# Patient Record
Sex: FEMALE | Race: WHITE | NOT HISPANIC OR LATINO
[De-identification: names, ages, dates, MRNs, and addresses within clinical notes are randomized per-mention and may not be internally consistent; named-entity substitution may affect disease eponyms.]

---

## 2020-03-20 PROBLEM — Z00.00 ENCOUNTER FOR PREVENTIVE HEALTH EXAMINATION: Status: ACTIVE | Noted: 2020-03-20

## 2020-05-04 ENCOUNTER — APPOINTMENT (OUTPATIENT)
Dept: SURGERY | Facility: CLINIC | Age: 50
End: 2020-05-04

## 2020-05-28 ENCOUNTER — APPOINTMENT (OUTPATIENT)
Dept: SURGERY | Facility: CLINIC | Age: 50
End: 2020-05-28

## 2020-08-14 ENCOUNTER — RECORD ABSTRACTING (OUTPATIENT)
Age: 50
End: 2020-08-14

## 2020-08-14 DIAGNOSIS — Z78.9 OTHER SPECIFIED HEALTH STATUS: ICD-10-CM

## 2020-08-14 DIAGNOSIS — Z82.49 FAMILY HISTORY OF ISCHEMIC HEART DISEASE AND OTHER DISEASES OF THE CIRCULATORY SYSTEM: ICD-10-CM

## 2020-08-14 DIAGNOSIS — Z80.0 FAMILY HISTORY OF MALIGNANT NEOPLASM OF DIGESTIVE ORGANS: ICD-10-CM

## 2020-08-14 RX ORDER — GLUC/MSM/COLGN2/HYAL/ANTIARTH3 375-375-20
TABLET ORAL
Refills: 0 | Status: ACTIVE | COMMUNITY

## 2020-09-29 ENCOUNTER — APPOINTMENT (OUTPATIENT)
Dept: SURGERY | Facility: CLINIC | Age: 50
End: 2020-09-29

## 2020-10-29 ENCOUNTER — APPOINTMENT (OUTPATIENT)
Dept: SURGERY | Facility: CLINIC | Age: 50
End: 2020-10-29
Payer: COMMERCIAL

## 2020-10-29 VITALS
HEART RATE: 89 BPM | TEMPERATURE: 97.8 F | HEIGHT: 66 IN | WEIGHT: 180 LBS | BODY MASS INDEX: 28.93 KG/M2 | OXYGEN SATURATION: 99 % | SYSTOLIC BLOOD PRESSURE: 132 MMHG | DIASTOLIC BLOOD PRESSURE: 81 MMHG

## 2020-10-29 DIAGNOSIS — Z78.9 OTHER SPECIFIED HEALTH STATUS: ICD-10-CM

## 2020-10-29 DIAGNOSIS — Z63.5 DISRUPTION OF FAMILY BY SEPARATION AND DIVORCE: ICD-10-CM

## 2020-10-29 PROCEDURE — 99203 OFFICE O/P NEW LOW 30 MIN: CPT

## 2020-10-29 PROCEDURE — 99072 ADDL SUPL MATRL&STAF TM PHE: CPT

## 2020-10-29 SDOH — SOCIAL STABILITY - SOCIAL INSECURITY: DISRUPTION OF FAMILY BY SEPARATION AND DIVORCE: Z63.5

## 2020-10-29 NOTE — PHYSICAL EXAM
[Alert] : alert [Oriented to Person] : oriented to person [Oriented to Place] : oriented to place [Oriented to Time] : oriented to time [Calm] : calm [de-identified] : She  is alert, well-groomed, and cheerful.\par   [de-identified] : anicteric.  Nasal mucosa pink, septum midline. Oral mucosa pink.  Tongue midline, Pharynx without exudates.\par   [de-identified] : Neck supple. Trachea midline. Thyroid isthmus barely palpable, lobes not felt.\par   [de-identified] : No chest deformity. Breast are symmetric, Normal contours. No nodules, masses, tenderness, or axillary or supraclavicular  adenopathy. No nipple discharge. no skin retraction \par

## 2020-10-29 NOTE — PLAN
[FreeTextEntry1] : Ms. YOUNGBLOOD  is presenting  today for an evaluation .  she is doing well and offers no complaints.  Results of  her recent  imaging and physical examination findings were discussed in details.   She  was advised to have           Left     breast Mammogram  to confirm position of the clip and return after the tests to schedule excision.     Patient's questions and concerns addressed to patient's satisfaction.\par \par

## 2020-10-29 NOTE — HISTORY OF PRESENT ILLNESS
[de-identified] : Patient is a 50 year  year-old female  who was referred by Dr. Martinez at [facility]  with the chief complaint of having a Left   breast  intraductal papilloma . She  denies any trauma and She has no nipple discharge. She  denies any fever, night sweats or loss of appetite.  She reports family history of breast cancer: Sister age 40. Aunt age 41. She states that she  is BRCA negative.     Menarche at age 14 -2 para-2 and her last menstrual period was in September. Patient is on no hormonal replacement therapy.    Patient  had a BL mammogram on 2020 that was deemed a BIRADS 0.  She had a call back left breast mammo on 2020 that was deemed BIRADS 2.   She had a BL   sonogram of the breast on 2020 it was deemed a BIRADS 4. Ultrasound-guided core biopsy left breast x2 recommended. A sono-guided biopsy 2 sites  was done on 03/10/2020 which the pathology stated Left breast 4 o'clock 1 cm from nipple shows intraductal papilloma. Left breast 2 o'clock 6 cm from nipple shows benign fibroadenoma. Pathology results match the prebiopsy imaging appearance.  she reports breast lift in 2020.  She states that the plan was to remove the papilloma at the time of the breast lift. it was not removed. she is not sure if the clip was removed.

## 2020-10-29 NOTE — DATA REVIEWED
[FreeTextEntry1] : 	\par Exam requested by:\par PERRI VALENTIN MD\par [address] [facility] [address]\par [facility] [address]\par SITE PERFORMED: [place]\par SITE PHONE: [phone]\par Patient: JEANNE YOUNGBLOOD\par YOB: 1970\par Phone: [phone]\par MRN: [id number] Acc: [id number]\par Date of Exam: 02-\par  \par EXAM: ULTRASOUND BREAST BILATERAL COMPLETE\par \par HISTORY: The patient is 49 years old and is seen for supplemental screening evaluation of dense breasts. Patient reports: BRCA negative. Benign left breast procedure, years ago. Family history of breast cancer: Sister age 40. Aunt age 41.\par \par TECHNIQUE: A bilateral breast ultrasound was performed with complete evaluation of the four quadrants, retroareolar regions, and axillae.\par \par COMPARISON: 2017 breast MRI report. 2/17/2020 2/2/2020 mammography. 2017 breast ultrasound. 2015 breast ultrasound.\par \par FINDINGS:\par \par \par Right 12 o'clock 5 cm from nipple mass, 1 cm, no suspicious change. Right 12 o'clock 6 cm from nipple mass, 0.9 cm, no suspicious change. Right 11-12 o'clock 5 cm from nipple septated cyst, 1 cm. Right 11-12 o'clock 4 to 5 cm from nipple cyst, 1.9 cm. Right 11-12 o'clock 4 cm from nipple multiple subcentimeter cysts. Right 3 o'clock 4 cm from nipple, subcentimeter cyst. Right 3-4 o'clock 7 cm from nipple septated complicated cyst cluster versus mass, 1.3 cm, not previously seen, probably benign. Right 3-4 o'clock 2 cm from nipple septated complicated cyst, 0.8 cm, no suspicious change. Right 2-3 o'clock retroareolar few subcentimeter cysts. Right 5 o'clock 2 cm from nipple subcentimeter cyst. Right retroareolar 6-7 o'clock 2 subcentimeter cysts. Right 10 o'clock 5 cm from nipple cyst, 1.1 cm. Right 10 o'clock 5 cm from nipple multiple subcentimeter cysts. Benign right axilla.\par \par Left 11-12 o'clock 5 6 cm from nipple septated cyst, 3.3 cm, no suspicious change. Left 11-12 o'clock 5 cm from nipple complicated cyst, 1.1 cm, no suspicious change. Left 1 o'clock 5 cm from nipple septated cyst cluster, 1.1 cm, no suspicious change. Left 1 o'clock 6 cm from nipple mass versus complicated cyst, 0.5 cm, not previously seen, probably benign. Left 2 o'clock 6 cm from nipple lobulated mass, partially oblique orientation, 1.7 x 0.9 x 1.4 cm, not previously seen, indeterminate. Left 2-3 o'clock 7 cm from nipple cyst, 1.3 cm. Left 3 o'clock retroareolar few subcentimeter cysts. Left 4 o'clock 1 cm from nipple mass, 1 cm, lobulated, appears to represent left 3 o'clock retroareolar mass on prior ultrasound, increased in size, indeterminate. Left 10 o'clock retroareolar cyst, 1 cm. Benign left axilla.\par \par IMPRESSION:  \par \par 1.  Indeterminate ultrasound lesions at left breast 2 o'clock 6 cm from nipple and left breast 4 o'clock 1 cm from nipple. Ultrasound-guided core biopsy left breast x2 recommended.\par \par 2.  Probably benign ultrasound findings at right breast 3-4 o'clock 7 cm from nipple and left breast 1 o'clock 6 cm from nipple. Follow-up targeted bilateral breast ultrasound in 6 months also recommended.\par \par 3.  Follow-up screening breast MRI should be considered given significant family history. Please note that MRI is not a substitute for the additional imaging recommended above in this case.\par \par FOLLOW-UP: Ultrasound guided biopsy.\par \par \par ASSESSMENT: BI-RADS Category 4:  Suspicious.\par \par This examination should not preclude the clinical evaluation of a suspicious palpable abnormality. \par \par Thank you for the opportunity to participate in the care of this patient.  \par  \par Jorge Luis Elmore MD  - Electronically Signed: 02- 4:08 PM \par Physician to Physician Direct Line is: [phone]

## 2020-11-12 ENCOUNTER — APPOINTMENT (OUTPATIENT)
Dept: SURGERY | Facility: CLINIC | Age: 50
End: 2020-11-12
Payer: COMMERCIAL

## 2020-11-12 VITALS
HEIGHT: 66 IN | SYSTOLIC BLOOD PRESSURE: 149 MMHG | DIASTOLIC BLOOD PRESSURE: 89 MMHG | HEART RATE: 72 BPM | BODY MASS INDEX: 28.93 KG/M2 | TEMPERATURE: 98.2 F | WEIGHT: 180 LBS

## 2020-11-12 PROCEDURE — 99213 OFFICE O/P EST LOW 20 MIN: CPT

## 2020-11-12 PROCEDURE — 99072 ADDL SUPL MATRL&STAF TM PHE: CPT

## 2020-11-12 NOTE — PHYSICAL EXAM
[Alert] : alert [Oriented to Person] : oriented to person [Oriented to Place] : oriented to place [Oriented to Time] : oriented to time [Calm] : calm [de-identified] : She  is alert, well-groomed, and cheerful.\par   [de-identified] : anicteric.  Nasal mucosa pink, septum midline. Oral mucosa pink.  Tongue midline, Pharynx without exudates.\par   [de-identified] : Neck supple. Trachea midline. Thyroid isthmus barely palpable, lobes not felt.\par   [de-identified] : No chest deformity. Breast are symmetric, Normal contours. No nodules, masses, tenderness, or axillary or supraclavicular  adenopathy. No nipple discharge. no skin retraction \par

## 2020-11-12 NOTE — HISTORY OF PRESENT ILLNESS
[de-identified] : Patient is a 50 year year-old female who was referred by Dr. Martinez at [facility] with the chief complaint of having a Left breast intraductal papilloma. Ms. YOUNGBLOOD  is s/p  unilateral left diagnostic mammogram  on 11/03/2020 to confirm presence of the  biopsy clips because she had cosmetic breast surgery. results show biopsy marker   in the upper outer left breast as well as in the mid outer anterior left breast. patient is here to discuss her surgical options. \par \par Note**\par Left breast 4 o'clock 1 cm from nipple shows intraductal papilloma.\par  A rectangle shaped biopsy clip ([employer], [employer]) was deployed at the biopsy site. A sonographically visualized residual lesion was present post core biopsy.

## 2020-11-12 NOTE — DATA REVIEWED
[FreeTextEntry1] : 	\par Exam requested by:\par JUSTIN WEBSTER MD\par [address] [facility]\par [facility] [address]\par SITE PERFORMED: [place]\par SITE PHONE: [phone]\par Patient: JEANNE YOUNGBLOOD\par YOB: 1970\par Phone: [phone]\par MRN: [id number] Acc: [id number]\par Date of Exam: 11-\par  \par Addendum 1 - 11-\par  \par ADDENDUM TO REPORT: Digital unilateral left diagnostic mammogram with CAD and Tomosynthesis November 3, 2020\par \par BREAST COMPOSITION: The breasts are heterogeneously dense, which may obscure small masses.\par \par Please note that after further clinical information was supplied, the patient has not yet undergone an excisional biopsy. The patient has undergone a breast lift. A biopsy marker is noted in the upper outer left breast as well as in the mid outer anterior left breast.\par \par FOLLOW-UP: Additional imaging.\par Follow-up left breast ultrasound recommended\par ASSESSMENT: BI-RADS Category 0:  Incomplete. Need additional imaging evaluation.\par \par Thank you for the opportunity to participate in the care of this patient.  \par  \par Michael Gil MD  - Electronically Signed: 11- 10:36 AM \par Physician to Physician Direct Line is: [phone]\par Original Report\par EXAM: DIGITAL UNILATERAL LEFT DIAGNOSTIC MAMMOGRAM WITH CAD AND TOMOSYNTHESIS\par \par HISTORY: The patient is 50 years old and is seen for evaluation post excisional biopsy for intraductal papilloma hours sister and aunt \par \par CLINICAL BREAST EXAMINATION: The patient reports that her last clinical breast exam was within the past year. \par \par TECHNIQUE: Full-field digital mammography of the left breast was obtained. Additional imaging is composed of spot compression view left breast in cc projection Low-dose full-field digital breast tomosynthesis examination was performed with 3D acquisitions and C-View synthesized 2D reconstructed images. Computer-aided detection (CAD) was utilized. \par \par COMPARISON: The present examination has been compared to prior breast imaging studies dating back to November 12, 2015 \par \par FINDINGS:\par BREAST COMPOSITION: The breasts are heterogeneously dense, which may obscure small masses.\par \par Patient is status post excisional biopsy upper outer left breast with some associated distortion. Biopsy marker is also seen anterior left breast 3 4:00. There are some scattered punctate benign-appearing calcifications left breast. 8 mm nodule rounded nodule density central slightly lateral left breast on CC view. Follow-up breast ultrasound recommended.\par \par IMPRESSION: Status post excisional biopsy upper outer left breast as well as biopsy 3 -4:00 left breast. 8mm rounded nodule central slightly lateral left breast\par \par FOLLOW-UP: Additional imaging.\par Follow-up left breast ultrasound recommended  \par \par ASSESSMENT: BI-RADS Category 0:  Incomplete. Need additional imaging evaluation. \par \par This examination should not preclude the clinical evaluation of a suspicious palpable abnormality. \par \par As per the FDA requirements, a layman's letter has been generated and sent to your patient stating the results and recommendations of this breast imaging study. We have entered your patient into our reminder system and will notify them when they are due for their next breast imaging exam. \par \par Thank you for the opportunity to participate in the care of this patient.  \par  \par Michael Gil MD  - Electronically Signed: 11- 3:03 PM \par Physician to Physician Direct Line is: [phone]\par \par \par Left breast 4 o'clock 1 cm from nipple shows intraductal papilloma.\par  A rectangle shaped biopsy clip ([employer], [facility]) was deployed at the biopsy site. A sonographically visualized residual lesion was present post core biopsy. \par

## 2020-11-12 NOTE — PLAN
[FreeTextEntry1] : Ms. YOUNGBLOOD  was told significance of findings, options, risks and benefits were explained.  Informed consent for excision left breast mass with spot localization and potential risks, benefits and alternatives (surgical options were discussed including non-surgical options or the option of no surgery) to the planned surgery were discussed in depth.  All surgical options were discussed including non-surgical treatments.  She wishes to proceed with surgery.  We will plan for surgery on at the next available date, pending any required insurance pre-certification or pre-approval. She agrees to obtain any necessary pre-operative evaluations and testing prior to surgery.\par Patient advised to seek immediate medical attention with any acute change in symptoms or with the development of any new or worsening symptoms.  Patient's questions and concerns addressed to patient's satisfaction, and patient verbalized an understanding of the information discussed.\par \par

## 2020-12-06 ENCOUNTER — APPOINTMENT (OUTPATIENT)
Dept: DISASTER EMERGENCY | Facility: CLINIC | Age: 50
End: 2020-12-06

## 2020-12-11 ENCOUNTER — OUTPATIENT (OUTPATIENT)
Dept: OUTPATIENT SERVICES | Facility: HOSPITAL | Age: 50
LOS: 1 days | End: 2020-12-11
Payer: COMMERCIAL

## 2020-12-11 ENCOUNTER — OUTPATIENT (OUTPATIENT)
Dept: OUTPATIENT SERVICES | Facility: HOSPITAL | Age: 50
LOS: 1 days | End: 2020-12-11

## 2020-12-11 VITALS
HEART RATE: 75 BPM | OXYGEN SATURATION: 100 % | TEMPERATURE: 98 F | SYSTOLIC BLOOD PRESSURE: 123 MMHG | DIASTOLIC BLOOD PRESSURE: 85 MMHG | WEIGHT: 195.11 LBS | HEIGHT: 66 IN | RESPIRATION RATE: 18 BRPM

## 2020-12-11 DIAGNOSIS — Z01.818 ENCOUNTER FOR OTHER PREPROCEDURAL EXAMINATION: ICD-10-CM

## 2020-12-11 DIAGNOSIS — N63.32 UNSPECIFIED LUMP IN AXILLARY TAIL OF THE LEFT BREAST: ICD-10-CM

## 2020-12-11 DIAGNOSIS — N63.20 UNSPECIFIED LUMP IN THE LEFT BREAST, UNSPECIFIED QUADRANT: ICD-10-CM

## 2020-12-11 DIAGNOSIS — Z98.890 OTHER SPECIFIED POSTPROCEDURAL STATES: Chronic | ICD-10-CM

## 2020-12-11 PROCEDURE — G0463: CPT

## 2020-12-11 NOTE — H&P PST ADULT - NSICDXPASTSURGICALHX_GEN_ALL_CORE_FT
PAST SURGICAL HISTORY:  History of abdominoplasty     History of breast surgery Bilateral breast lift

## 2020-12-11 NOTE — H&P PST ADULT - NSICDXPROBLEM_GEN_ALL_CORE_FT
PROBLEM DIAGNOSES  Problem: Unspecified lump in axillary tail of the left breast  Assessment and Plan: Patient is scheduled for excision of left breast mass with spot localization on 12/30/2020. Preoperative instructions given to patient with understanding verbalized

## 2020-12-11 NOTE — H&P PST ADULT - NEGATIVE HEMATOLOGY SYMPTOMS
Patient informed of need for supplies, and house supervisor getting supplies.       Dinah Quevedo RN  09/05/20 6676 no gum bleeding/no nose bleeding/no skin lumps

## 2020-12-11 NOTE — H&P PST ADULT - NSANTHOSAYNRD_GEN_A_CORE
No. YOHANNES screening performed.  STOP BANG Legend: 0-2 = LOW Risk; 3-4 = INTERMEDIATE Risk; 5-8 = HIGH Risk

## 2020-12-11 NOTE — H&P PST ADULT - HISTORY OF PRESENT ILLNESS
50year old female with pmhx of anemia presents with c/o abnormal mammogram revealing benign left breast papilloma confirmed by biopsy. Patient is here today for presurgical testing for scheduled excision of left breast mass with spot localization on 12/30/2020 50year old obese female with pmhx of anemia presents with c/o abnormal mammogram revealing benign left breast papilloma confirmed by biopsy. Patient is here today for presurgical testing for scheduled excision of left breast mass with spot localization on 12/30/2020

## 2020-12-15 PROBLEM — D64.9 ANEMIA, UNSPECIFIED: Chronic | Status: ACTIVE | Noted: 2020-12-11

## 2020-12-15 PROBLEM — E66.9 OBESITY, UNSPECIFIED: Chronic | Status: ACTIVE | Noted: 2020-12-11

## 2020-12-27 ENCOUNTER — APPOINTMENT (OUTPATIENT)
Dept: DISASTER EMERGENCY | Facility: CLINIC | Age: 50
End: 2020-12-27

## 2020-12-27 DIAGNOSIS — Z01.818 ENCOUNTER FOR OTHER PREPROCEDURAL EXAMINATION: ICD-10-CM

## 2020-12-28 LAB — SARS-COV-2 N GENE NPH QL NAA+PROBE: NOT DETECTED

## 2020-12-29 ENCOUNTER — TRANSCRIPTION ENCOUNTER (OUTPATIENT)
Age: 50
End: 2020-12-29

## 2020-12-30 ENCOUNTER — APPOINTMENT (OUTPATIENT)
Dept: SURGERY | Facility: HOSPITAL | Age: 50
End: 2020-12-30
Payer: COMMERCIAL

## 2020-12-30 ENCOUNTER — OUTPATIENT (OUTPATIENT)
Dept: OUTPATIENT SERVICES | Facility: HOSPITAL | Age: 50
LOS: 1 days | End: 2020-12-30
Payer: COMMERCIAL

## 2020-12-30 ENCOUNTER — RESULT REVIEW (OUTPATIENT)
Age: 50
End: 2020-12-30

## 2020-12-30 VITALS
RESPIRATION RATE: 16 BRPM | SYSTOLIC BLOOD PRESSURE: 131 MMHG | OXYGEN SATURATION: 99 % | HEART RATE: 89 BPM | TEMPERATURE: 98 F | DIASTOLIC BLOOD PRESSURE: 73 MMHG

## 2020-12-30 VITALS
WEIGHT: 195.11 LBS | HEIGHT: 66 IN | HEART RATE: 80 BPM | SYSTOLIC BLOOD PRESSURE: 152 MMHG | DIASTOLIC BLOOD PRESSURE: 86 MMHG | OXYGEN SATURATION: 99 % | TEMPERATURE: 98 F | RESPIRATION RATE: 18 BRPM

## 2020-12-30 DIAGNOSIS — N63.32 UNSPECIFIED LUMP IN AXILLARY TAIL OF THE LEFT BREAST: ICD-10-CM

## 2020-12-30 DIAGNOSIS — Z98.890 OTHER SPECIFIED POSTPROCEDURAL STATES: Chronic | ICD-10-CM

## 2020-12-30 LAB — HCG UR QL: NEGATIVE — SIGNIFICANT CHANGE UP

## 2020-12-30 PROCEDURE — 19125 EXCISION BREAST LESION: CPT | Mod: LT

## 2020-12-30 PROCEDURE — 19281 PERQ DEVICE BREAST 1ST IMAG: CPT | Mod: LT

## 2020-12-30 PROCEDURE — 88307 TISSUE EXAM BY PATHOLOGIST: CPT

## 2020-12-30 PROCEDURE — 19281 PERQ DEVICE BREAST 1ST IMAG: CPT

## 2020-12-30 PROCEDURE — 76098 X-RAY EXAM SURGICAL SPECIMEN: CPT

## 2020-12-30 PROCEDURE — 76098 X-RAY EXAM SURGICAL SPECIMEN: CPT | Mod: 26

## 2020-12-30 PROCEDURE — 88307 TISSUE EXAM BY PATHOLOGIST: CPT | Mod: 26

## 2020-12-30 PROCEDURE — 81025 URINE PREGNANCY TEST: CPT

## 2020-12-30 RX ORDER — FERROUS SULFATE 325(65) MG
1 TABLET ORAL
Qty: 0 | Refills: 0 | DISCHARGE

## 2020-12-30 RX ORDER — HYDROMORPHONE HYDROCHLORIDE 2 MG/ML
1 INJECTION INTRAMUSCULAR; INTRAVENOUS; SUBCUTANEOUS
Refills: 0 | Status: DISCONTINUED | OUTPATIENT
Start: 2020-12-30 | End: 2020-12-30

## 2020-12-30 RX ORDER — GABAPENTIN 400 MG/1
1 CAPSULE ORAL
Qty: 6 | Refills: 0
Start: 2020-12-30 | End: 2021-01-01

## 2020-12-30 RX ORDER — SODIUM CHLORIDE 9 MG/ML
3 INJECTION INTRAMUSCULAR; INTRAVENOUS; SUBCUTANEOUS EVERY 8 HOURS
Refills: 0 | Status: DISCONTINUED | OUTPATIENT
Start: 2020-12-30 | End: 2020-12-30

## 2020-12-30 RX ORDER — ASCORBIC ACID 60 MG
1 TABLET,CHEWABLE ORAL
Qty: 0 | Refills: 0 | DISCHARGE

## 2020-12-30 RX ORDER — ONDANSETRON 8 MG/1
4 TABLET, FILM COATED ORAL ONCE
Refills: 0 | Status: DISCONTINUED | OUTPATIENT
Start: 2020-12-30 | End: 2020-12-30

## 2020-12-30 RX ORDER — HYDROMORPHONE HYDROCHLORIDE 2 MG/ML
0.5 INJECTION INTRAMUSCULAR; INTRAVENOUS; SUBCUTANEOUS
Refills: 0 | Status: DISCONTINUED | OUTPATIENT
Start: 2020-12-30 | End: 2020-12-30

## 2020-12-30 RX ORDER — SODIUM CHLORIDE 9 MG/ML
1000 INJECTION, SOLUTION INTRAVENOUS
Refills: 0 | Status: DISCONTINUED | OUTPATIENT
Start: 2020-12-30 | End: 2020-12-30

## 2020-12-30 NOTE — BRIEF OPERATIVE NOTE - NSICDXBRIEFPROCEDURE_GEN_ALL_CORE_FT
PROCEDURES:  Excision of left breast mass with needle localization 30-Dec-2020 13:23:44  Susan Montgomery

## 2021-01-05 LAB — SURGICAL PATHOLOGY STUDY: SIGNIFICANT CHANGE UP

## 2021-02-04 PROBLEM — N63.20 BREAST MASS, LEFT: Status: ACTIVE | Noted: 2020-10-27

## 2021-02-04 NOTE — DATA REVIEWED
[FreeTextEntry1] : JEANNE YOUNGBLOOD                          2\par \par \par \par Surgical Final Report\par \par \par \par \par Final Diagnosis\par \par Mass, left breast; excision with needle guided localization:\par - Intraductal papilloma with usual ductal epithelial\par hyperplasia.\par - Foci of atypical lobular hyperplasia with pagetoid spread into\par the ducts.\par - Tissue changes consistent with previous surgical intervention.\par - Fibrocystic change harboring microcalcifications and including\par usual, micropapillary and papillary ductal epithelial\par hyperplasia, apocrine metaplasia, sclerosing and blunt duct\par adenosis, nodular adenosis, stromal\par fibrosis, fibroadenomatoid stromal change and cysts.\par \par Verified by: Brianna Salas MD\par (Electronic Signature)\par Reported on: 01/05/21 13:44 EST, [facility],\par [address], [place] [address]\par Phone: [phone]   Fax: [phone]\par _________________________________________________________________\par \par Clinical History\par Left breast lump\par \par Specimen(s) Submitted\par 1     Left breast mass\par

## 2021-02-08 ENCOUNTER — APPOINTMENT (OUTPATIENT)
Dept: SURGERY | Facility: CLINIC | Age: 51
End: 2021-02-08

## 2021-02-08 DIAGNOSIS — N63.20 UNSPECIFIED LUMP IN THE LEFT BREAST, UNSPECIFIED QUADRANT: ICD-10-CM

## 2022-05-24 ENCOUNTER — APPOINTMENT (OUTPATIENT)
Dept: INTERNAL MEDICINE | Facility: CLINIC | Age: 52
End: 2022-05-24

## 2022-12-28 NOTE — HISTORY OF PRESENT ILLNESS
Vital signs reviewed.   CONSTITUTIONAL: Well-appearing; well-nourished; in no apparent distress. Non-toxic appearing.   HEAD: Normocephalic, atraumatic.  EYES: PERRL, EOM intact, conjunctiva and sclera WNL.  CARD: Normal S1, S2; no murmurs, rubs, or gallops noted.  RESP: Normal chest excursion with respiration; breath sounds clear and equal bilaterally; no wheezes, rhonchi, or rales.  ABD/GI: soft, non-distended; non-tender; no palpable organomegaly, no pulsatile mass.  EXT/MS: moves all extremities; distal pulses are normal, no pedal edema.  SKIN: Normal for age and race; warm; dry; good turgor; no apparent lesions or exudate noted.  NEURO: Awake, alert, oriented x 3, no gross deficits, CN II-XII grossly intact, no motor or sensory deficit noted.  PSYCH: Normal mood; appropriate affect.
[de-identified] : Ms. YOUNGBLOOD  is s/p  :Excision of left breast mass after spot localization      on  12/30/2020. Patient's pathology results were  consistent with Intraductal papilloma , and foci of atypical lobular hyperplasia with pagetoid spread into the ducts.

## 2024-02-28 NOTE — ASU PATIENT PROFILE, ADULT - FALL HARM RISK TYPE OF ASSESSMENT
Review of Systems:  Severe or unusual headache: No  Hearing problems: No  Vision problems: No  Sinus problems or allergies: No  Hoarseness or change in voice: No  Problems with your teeth or gums: No  Skin problems: No  Weight loss or gain: No  Chest pain or palpitations: No  Shortness of breath: No  Cough or coughing up blood: No  Stomach pain, nausea or vomiting: No  Constipation or diarrhea: No  Blood in bowel movements: No  Problems with urination or blood in urine: No  Muscle aches or joint pain: No  Sexual difficulties: No  Depression, sleep problems or severe stress: No  Easy bruising or bleeding, or enlarged glands: No  Speech or memory problems: No  Numbness or tingling: No   Admission present